# Patient Record
Sex: MALE | Race: WHITE | NOT HISPANIC OR LATINO | Employment: OTHER | RURAL
[De-identification: names, ages, dates, MRNs, and addresses within clinical notes are randomized per-mention and may not be internally consistent; named-entity substitution may affect disease eponyms.]

---

## 2022-08-03 ENCOUNTER — HOSPITAL ENCOUNTER (OUTPATIENT)
Dept: RADIOLOGY | Facility: HOSPITAL | Age: 77
Discharge: HOME | End: 2022-08-03
Attending: INTERNAL MEDICINE
Payer: OTHER GOVERNMENT

## 2022-08-03 DIAGNOSIS — I73.9 PERIPHERAL VASCULAR DISEASE, UNSPECIFIED: ICD-10-CM

## 2022-08-03 PROCEDURE — 93925 LOWER EXTREMITY STUDY: CPT | Mod: TC

## 2022-08-03 PROCEDURE — 93925 LOWER EXTREMITY STUDY: CPT | Mod: 26,,, | Performed by: RADIOLOGY

## 2022-08-03 PROCEDURE — 93925 US ARTERIAL LOWER EXTREMITY BILAT WITH ABI (XPD): ICD-10-PCS | Mod: 26,,, | Performed by: RADIOLOGY

## 2022-08-03 PROCEDURE — 93922 UPR/L XTREMITY ART 2 LEVELS: CPT | Mod: 26,,, | Performed by: RADIOLOGY

## 2022-08-03 PROCEDURE — 93922 US ARTERIAL LOWER EXTREMITY BILAT WITH ABI (XPD): ICD-10-PCS | Mod: 26,,, | Performed by: RADIOLOGY

## 2024-07-18 ENCOUNTER — INITIAL CONSULT (OUTPATIENT)
Dept: VASCULAR SURGERY | Facility: CLINIC | Age: 79
End: 2024-07-18
Payer: OTHER GOVERNMENT

## 2024-07-18 VITALS
HEART RATE: 79 BPM | TEMPERATURE: 98 F | HEIGHT: 71 IN | DIASTOLIC BLOOD PRESSURE: 57 MMHG | SYSTOLIC BLOOD PRESSURE: 110 MMHG | OXYGEN SATURATION: 97 % | BODY MASS INDEX: 30.38 KG/M2 | WEIGHT: 217 LBS

## 2024-07-18 DIAGNOSIS — I73.9 PVD (PERIPHERAL VASCULAR DISEASE): Primary | ICD-10-CM

## 2024-07-18 PROCEDURE — 99214 OFFICE O/P EST MOD 30 MIN: CPT | Mod: PBBFAC | Performed by: SURGERY

## 2024-07-18 PROCEDURE — 99202 OFFICE O/P NEW SF 15 MIN: CPT | Mod: S$PBB,,, | Performed by: SURGERY

## 2024-07-18 PROCEDURE — 99999 PR PBB SHADOW E&M-EST. PATIENT-LVL IV: CPT | Mod: PBBFAC,,, | Performed by: SURGERY

## 2024-07-18 RX ORDER — INSULIN GLARGINE 100 [IU]/ML
INJECTION, SOLUTION SUBCUTANEOUS DAILY
COMMUNITY
Start: 2022-06-22

## 2024-07-18 RX ORDER — ROSUVASTATIN CALCIUM 5 MG/1
5 TABLET, COATED ORAL DAILY
COMMUNITY
Start: 2024-06-25

## 2024-07-18 RX ORDER — GLIPIZIDE 10 MG/1
10 TABLET ORAL 2 TIMES DAILY
COMMUNITY

## 2024-07-18 RX ORDER — CETIRIZINE HYDROCHLORIDE 10 MG/1
10 TABLET ORAL DAILY
COMMUNITY

## 2024-07-18 RX ORDER — LEVETIRACETAM 500 MG/1
500 TABLET ORAL DAILY
COMMUNITY
Start: 2023-10-17

## 2024-07-18 RX ORDER — LISINOPRIL 10 MG/1
10 TABLET ORAL DAILY
COMMUNITY
Start: 2024-03-22

## 2024-07-18 RX ORDER — WARFARIN 2 MG/1
2 TABLET ORAL DAILY
COMMUNITY
Start: 2022-06-22

## 2024-07-18 RX ORDER — MAGNESIUM OXIDE 420 MG/1
840 TABLET ORAL
COMMUNITY
Start: 2024-04-17

## 2024-07-18 RX ORDER — METOPROLOL SUCCINATE 25 MG/1
25 TABLET, EXTENDED RELEASE ORAL DAILY
COMMUNITY
Start: 2022-06-22

## 2024-07-18 RX ORDER — CYANOCOBALAMIN 1000 UG/ML
1000 INJECTION, SOLUTION INTRAMUSCULAR; SUBCUTANEOUS ONCE
COMMUNITY
Start: 2022-06-22

## 2024-07-18 RX ORDER — AMMONIUM LACTATE 12 G/100G
CREAM TOPICAL
COMMUNITY

## 2024-07-18 RX ORDER — HYDROCHLOROTHIAZIDE 12.5 MG/1
12.5 TABLET ORAL DAILY
COMMUNITY
Start: 2024-04-17

## 2024-07-18 NOTE — PROGRESS NOTES
"Subjective:       Patient ID: Michi Paula is a 78 y.o. male.    Chief Complaint: Follow-up (No pain /Vascular disease )  New patient.  This have small wound in his right foot this week 2-0 his healing.  He has wound on the plantar aspect by the great toe of the left that is beginning to heal but has been there longer.  Said some noninvasive arterial studies show flow in all visualized vessels no focal doubling of velocities the pursued a CT angiogram that is suggests possibly some severe tibial disease he has no segmental pressures were ABIs to allow us to quantify the significance of his vascular disease we are going to obtain these  family history is not on file.  History reviewed. No pertinent past medical history.   No past surgical history on file.       HPI  Review of Systems      Objective:      BP (!) 110/57   Pulse 79   Temp 97.9 °F (36.6 °C)   Ht 5' 11" (1.803 m)   Wt 98.4 kg (217 lb)   SpO2 97%   BMI 30.27 kg/m²    Physical Exam  Constitutional:       Appearance: Normal appearance.   HENT:      Head: Normocephalic.   Cardiovascular:      Rate and Rhythm: Normal rate.   Musculoskeletal:         General: Normal range of motion.   Skin:     General: Skin is warm.      Capillary Refill: Capillary refill takes less than 2 seconds.      Comments: Small wound less than a cm scab dorsum base great toe right it was a linear 1/2 by proximally 3 or 4 mm clean based fissure appearing area of the plantar aspect of the great toe on left   Neurological:      General: No focal deficit present.      Mental Status: He is alert.   Psychiatric:         Mood and Affect: Mood normal.         Behavior: Behavior normal.         Thought Content: Thought content normal.         Judgment: Judgment normal.         Assessment:       1. PVD (peripheral vascular disease)        Plan:       Segmental limb pressures and ABIs follow-up after above      "

## 2024-08-15 ENCOUNTER — HOSPITAL ENCOUNTER (OUTPATIENT)
Dept: RADIOLOGY | Facility: HOSPITAL | Age: 79
Discharge: HOME | End: 2024-08-15
Attending: SURGERY
Payer: OTHER GOVERNMENT

## 2024-08-15 ENCOUNTER — OFFICE VISIT (OUTPATIENT)
Dept: VASCULAR SURGERY | Facility: CLINIC | Age: 79
End: 2024-08-15
Payer: OTHER GOVERNMENT

## 2024-08-15 VITALS — HEART RATE: 98 BPM | DIASTOLIC BLOOD PRESSURE: 70 MMHG | SYSTOLIC BLOOD PRESSURE: 115 MMHG | OXYGEN SATURATION: 99 %

## 2024-08-15 DIAGNOSIS — R23.4 WOUND ESCHAR OF FOOT: Primary | ICD-10-CM

## 2024-08-15 DIAGNOSIS — I73.9 PVD (PERIPHERAL VASCULAR DISEASE): ICD-10-CM

## 2024-08-15 PROCEDURE — 99999 PR PBB SHADOW E&M-EST. PATIENT-LVL III: CPT | Mod: PBBFAC,,, | Performed by: NURSE PRACTITIONER

## 2024-08-15 PROCEDURE — 99214 OFFICE O/P EST MOD 30 MIN: CPT | Mod: S$PBB,,, | Performed by: NURSE PRACTITIONER

## 2024-08-15 PROCEDURE — 99213 OFFICE O/P EST LOW 20 MIN: CPT | Mod: PBBFAC,25 | Performed by: NURSE PRACTITIONER

## 2024-08-15 PROCEDURE — 93923 UPR/LXTR ART STDY 3+ LVLS: CPT | Mod: TC

## 2024-08-15 NOTE — PROGRESS NOTES
Subjective:       Patient ID: Michi Paula is a 79 y.o. male.    Chief Complaint: Follow-up (Follow up after ultrasound )    family history is not on file.  History reviewed. No pertinent past medical history.   History reviewed. No pertinent surgical history.     08/15/2024  Previous evaluated by Dr. Erickson he is followed by [facility] for chronic left great toe wound patient is a diabetic nonsmoker arterial duplex with noncompressible right PT right DP 1.36  normal ABIs today right DON 1.36 left DON 1.22  decreased right TBI 0.56 suggest diabetic microvascular disease  Follow-up      Review of Systems      Objective:      /70   Pulse 98   SpO2 99%    Physical Exam  Vitals and nursing note reviewed.   Constitutional:       Appearance: Normal appearance.   HENT:      Head: Normocephalic.      Mouth/Throat:      Mouth: Mucous membranes are moist.   Eyes:      Conjunctiva/sclera: Conjunctivae normal.   Cardiovascular:      Rate and Rhythm: Normal rate and regular rhythm.   Pulmonary:      Effort: Pulmonary effort is normal.   Abdominal:      Palpations: Abdomen is soft.   Musculoskeletal:      Comments: Ambulates with walker   Skin:     General: Skin is warm and dry.      Comments: Posterior left great toe with callus and small approximately 1 cm ulcer does not appear to be infected at base of toe   Neurological:      Mental Status: He is alert and oriented to person, place, and time.   Psychiatric:         Mood and Affect: Mood normal.           Assessment:       1. Wound eschar of foot        Plan:     Educated on arterial duplex ABIs  Continue follow-up with wound care  No further vascular workup or intervention indicated  Recommend good diabetes control for wound healing  Call or return p.r.n.

## 2024-08-19 ENCOUNTER — OFFICE VISIT (OUTPATIENT)
Dept: FAMILY MEDICINE | Facility: CLINIC | Age: 79
End: 2024-08-19
Payer: OTHER GOVERNMENT

## 2024-08-19 ENCOUNTER — HOSPITAL ENCOUNTER (EMERGENCY)
Facility: HOSPITAL | Age: 79
Discharge: HOME | End: 2024-08-19
Payer: OTHER GOVERNMENT

## 2024-08-19 VITALS
BODY MASS INDEX: 28.98 KG/M2 | OXYGEN SATURATION: 97 % | HEIGHT: 71 IN | SYSTOLIC BLOOD PRESSURE: 130 MMHG | WEIGHT: 207 LBS | DIASTOLIC BLOOD PRESSURE: 68 MMHG | TEMPERATURE: 98 F | HEART RATE: 74 BPM | RESPIRATION RATE: 16 BRPM

## 2024-08-19 VITALS
RESPIRATION RATE: 20 BRPM | TEMPERATURE: 98 F | SYSTOLIC BLOOD PRESSURE: 126 MMHG | BODY MASS INDEX: 30.38 KG/M2 | OXYGEN SATURATION: 95 % | WEIGHT: 217 LBS | HEART RATE: 65 BPM | DIASTOLIC BLOOD PRESSURE: 67 MMHG | HEIGHT: 71 IN

## 2024-08-19 DIAGNOSIS — N50.89 SCROTAL EDEMA: ICD-10-CM

## 2024-08-19 DIAGNOSIS — N50.3 CYST OF EPIDIDYMIS DETERMINED BY ULTRASOUND: ICD-10-CM

## 2024-08-19 DIAGNOSIS — R33.9 ACUTE ON CHRONIC URINARY RETENTION: ICD-10-CM

## 2024-08-19 DIAGNOSIS — N50.89 SCROTAL SWELLING: Primary | ICD-10-CM

## 2024-08-19 DIAGNOSIS — N43.3 HYDROCELE, RIGHT: Primary | ICD-10-CM

## 2024-08-19 PROBLEM — I48.91 ATRIAL FIBRILLATION: Status: ACTIVE | Noted: 2024-08-19

## 2024-08-19 PROBLEM — G40.909 SEIZURE DISORDER: Status: ACTIVE | Noted: 2024-08-19

## 2024-08-19 PROBLEM — I10 BENIGN ESSENTIAL HYPERTENSION: Status: ACTIVE | Noted: 2024-08-19

## 2024-08-19 PROBLEM — E11.42 DIABETIC PERIPHERAL NEUROPATHY: Status: ACTIVE | Noted: 2024-08-19

## 2024-08-19 PROBLEM — I77.9 PERIPHERAL ARTERIAL OCCLUSIVE DISEASE: Status: ACTIVE | Noted: 2024-08-19

## 2024-08-19 PROBLEM — E78.5 HYPERLIPIDEMIA: Status: ACTIVE | Noted: 2024-08-19

## 2024-08-19 LAB
ALBUMIN SERPL BCP-MCNC: 3.1 G/DL (ref 3.5–5)
ALBUMIN/GLOB SERPL: 0.8 {RATIO}
ALP SERPL-CCNC: 58 U/L (ref 45–115)
ALT SERPL W P-5'-P-CCNC: 21 U/L (ref 16–61)
ANION GAP SERPL CALCULATED.3IONS-SCNC: 10 MMOL/L (ref 7–16)
AST SERPL W P-5'-P-CCNC: 20 U/L (ref 15–37)
BASOPHILS # BLD AUTO: 0.1 K/UL (ref 0–0.2)
BASOPHILS NFR BLD AUTO: 0.9 % (ref 0–1)
BILIRUB SERPL-MCNC: 0.8 MG/DL (ref ?–1.2)
BILIRUB UR QL STRIP: NEGATIVE
BUN SERPL-MCNC: 18 MG/DL (ref 7–18)
BUN/CREAT SERPL: 15 (ref 6–20)
CALCIUM SERPL-MCNC: 9.8 MG/DL (ref 8.5–10.1)
CHLORIDE SERPL-SCNC: 103 MMOL/L (ref 98–107)
CLARITY UR: ABNORMAL
CO2 SERPL-SCNC: 30 MMOL/L (ref 21–32)
COLOR UR: YELLOW
CREAT SERPL-MCNC: 1.19 MG/DL (ref 0.7–1.3)
DIFFERENTIAL METHOD BLD: ABNORMAL
EGFR (NO RACE VARIABLE) (RUSH/TITUS): 62 ML/MIN/1.73M2
EOSINOPHIL # BLD AUTO: 0.35 K/UL (ref 0–0.5)
EOSINOPHIL NFR BLD AUTO: 3.2 % (ref 1–4)
ERYTHROCYTE [DISTWIDTH] IN BLOOD BY AUTOMATED COUNT: 13.1 % (ref 11.5–14.5)
GLOBULIN SER-MCNC: 4 G/DL (ref 2–4)
GLUCOSE SERPL-MCNC: 106 MG/DL (ref 74–106)
GLUCOSE UR STRIP-MCNC: 500 MG/DL
HCT VFR BLD AUTO: 45.4 % (ref 40–54)
HGB BLD-MCNC: 14.8 G/DL (ref 13.5–18)
HYALINE CASTS #/AREA URNS LPF: ABNORMAL /LPF
IMM GRANULOCYTES # BLD AUTO: 0.05 K/UL (ref 0–0.04)
IMM GRANULOCYTES NFR BLD: 0.5 % (ref 0–0.4)
KETONES UR STRIP-SCNC: NEGATIVE MG/DL
LEUKOCYTE ESTERASE UR QL STRIP: NEGATIVE
LYMPHOCYTES # BLD AUTO: 3.1 K/UL (ref 1–4.8)
LYMPHOCYTES NFR BLD AUTO: 28.7 % (ref 27–41)
MCH RBC QN AUTO: 28.6 PG (ref 27–31)
MCHC RBC AUTO-ENTMCNC: 32.6 G/DL (ref 32–36)
MCV RBC AUTO: 87.8 FL (ref 80–96)
MONOCYTES # BLD AUTO: 1.08 K/UL (ref 0–0.8)
MONOCYTES NFR BLD AUTO: 10 % (ref 2–6)
MPC BLD CALC-MCNC: 10.6 FL (ref 9.4–12.4)
MUCOUS, UA: ABNORMAL /LPF
NEUTROPHILS # BLD AUTO: 6.13 K/UL (ref 1.8–7.7)
NEUTROPHILS NFR BLD AUTO: 56.7 % (ref 53–65)
NITRITE UR QL STRIP: NEGATIVE
NRBC # BLD AUTO: 0 X10E3/UL
NRBC, AUTO (.00): 0 %
PH UR STRIP: 5.5 PH UNITS
PLATELET # BLD AUTO: 289 K/UL (ref 150–400)
POTASSIUM SERPL-SCNC: 3.6 MMOL/L (ref 3.5–5.1)
PROT SERPL-MCNC: 7.1 G/DL (ref 6.4–8.2)
PROT UR QL STRIP: 20
RBC # BLD AUTO: 5.17 M/UL (ref 4.6–6.2)
RBC # UR STRIP: ABNORMAL /UL
RBC #/AREA URNS HPF: 3 /HPF
SODIUM SERPL-SCNC: 139 MMOL/L (ref 136–145)
SP GR UR STRIP: 1.02
SQUAMOUS #/AREA URNS LPF: ABNORMAL /HPF
UROBILINOGEN UR STRIP-ACNC: NORMAL MG/DL
WBC # BLD AUTO: 10.81 K/UL (ref 4.5–11)
WBC #/AREA URNS HPF: 6 /HPF

## 2024-08-19 PROCEDURE — 81001 URINALYSIS AUTO W/SCOPE: CPT | Performed by: NURSE PRACTITIONER

## 2024-08-19 PROCEDURE — 81003 URINALYSIS AUTO W/O SCOPE: CPT | Performed by: NURSE PRACTITIONER

## 2024-08-19 PROCEDURE — 51798 US URINE CAPACITY MEASURE: CPT

## 2024-08-19 PROCEDURE — 99284 EMERGENCY DEPT VISIT MOD MDM: CPT | Mod: 25

## 2024-08-19 PROCEDURE — 99202 OFFICE O/P NEW SF 15 MIN: CPT | Mod: ,,, | Performed by: NURSE PRACTITIONER

## 2024-08-19 PROCEDURE — 85025 COMPLETE CBC W/AUTO DIFF WBC: CPT | Performed by: NURSE PRACTITIONER

## 2024-08-19 PROCEDURE — 25000003 PHARM REV CODE 250: Performed by: NURSE PRACTITIONER

## 2024-08-19 PROCEDURE — 80053 COMPREHEN METABOLIC PANEL: CPT | Performed by: NURSE PRACTITIONER

## 2024-08-19 RX ORDER — TAMSULOSIN HYDROCHLORIDE 0.4 MG/1
0.4 CAPSULE ORAL
Status: COMPLETED | OUTPATIENT
Start: 2024-08-19 | End: 2024-08-19

## 2024-08-19 RX ORDER — TAMSULOSIN HYDROCHLORIDE 0.4 MG/1
0.4 CAPSULE ORAL DAILY
Qty: 10 CAPSULE | Refills: 0 | Status: SHIPPED | OUTPATIENT
Start: 2024-08-19 | End: 2024-08-29

## 2024-08-19 RX ADMIN — TAMSULOSIN HYDROCHLORIDE 0.4 MG: 0.4 CAPSULE ORAL at 06:08

## 2024-08-19 NOTE — PROGRESS NOTES
Subjective:       Patient ID: Michi Paula is a 79 y.o. male.    Chief Complaint: Scrotal swelling    Scrotal swelling x 1 month- no pain, discharge or dysuria- no known injury  Pt sent to [facility] for further evaluation and treatment- accompanied by his wife- report called to [facility]  Called and spoke with urology and they recommended sending pt to ER since we are unable to get a Testicular US approved today d/t his insurance    Review of Systems   Constitutional:  Negative for appetite change, chills, fatigue and fever.   HENT:  Negative for nasal congestion, ear pain and sore throat.    Eyes:  Negative for pain, discharge and itching.   Respiratory:  Negative for cough and shortness of breath.    Cardiovascular:  Negative for chest pain and leg swelling.   Gastrointestinal:  Negative for abdominal pain, change in bowel habit, nausea and vomiting.   Genitourinary:  Positive for scrotal swelling. Negative for dysuria, flank pain, frequency, genital sores and urgency.   Musculoskeletal:  Negative for back pain, gait problem and neck pain.   Integumentary:  Negative for rash and wound.   Neurological:  Negative for dizziness, weakness and headaches.   All other systems reviewed and are negative.        Objective:      Physical Exam  Vitals and nursing note reviewed.   Constitutional:       General: He is not in acute distress.     Appearance: Normal appearance. He is not ill-appearing, toxic-appearing or diaphoretic.   HENT:      Head: Normocephalic.   Eyes:      General: No scleral icterus.     Extraocular Movements: Extraocular movements intact.      Pupils: Pupils are equal, round, and reactive to light.   Cardiovascular:      Rate and Rhythm: Normal rate and regular rhythm.      Pulses: Normal pulses.      Heart sounds: Normal heart sounds. No murmur heard.  Pulmonary:      Effort: Pulmonary effort is normal. No respiratory distress.      Breath sounds: Normal breath sounds. No wheezing, rhonchi or  rales.   Genitourinary:     Pubic Area: No rash.       Penis: No discharge.       Comments: Scrotum is firm, swollen, and erythematous- no lesions, no tenderness, drainage or rash noted  Musculoskeletal:         General: Normal range of motion.      Cervical back: Neck supple. No tenderness.   Lymphadenopathy:      Cervical: No cervical adenopathy.   Skin:     General: Skin is warm and dry.      Capillary Refill: Capillary refill takes less than 2 seconds.      Findings: No lesion or rash.   Neurological:      Mental Status: He is alert and oriented to person, place, and time.   Psychiatric:         Mood and Affect: Mood normal.         Behavior: Behavior normal.         Thought Content: Thought content normal.         Judgment: Judgment normal.       No visits with results within 6 Month(s) from this visit.   Latest known visit with results is:   No results found for any previous visit.      Assessment:       1. Scrotal swelling        Plan:   Scrotal swelling           Risks, benefits, and side effects were discussed with the patient. All questions were answered to the fullest satisfaction of the patient, and pt verbalized understanding and agreement to treatment plan. Pt was to call with any new or worsening symptoms, or present to the ER

## 2024-08-19 NOTE — DISCHARGE INSTRUCTIONS
[facility]  Take medications as prescribed.  Return to the ER with new or worsening symptoms.

## 2024-08-19 NOTE — ED PROVIDER NOTES
Encounter Date: 8/19/2024       History     Chief Complaint   Patient presents with    Groin Swelling     Pt presents to ED via POV with c/o swollen right testicle since 2 months and has no pain.      Patient presents to the ER with complaint of scrotal edema.  Patient reports symptoms started 2 months ago.  He states the symptoms have been worse over the last month.  He reports he only urinates once or twice daily.  He states that has been ongoing for longer than 2 months.  [facility]  He denies fever.  He denies pain with urination.  He does report a fall 2 months ago that injured his left hip and groin.  He states he was slipped while getting in the bathtub.  This happened just prior to symptoms starting.  Patient was had x-rays done by primary care provider in symptoms from the fall have otherwise improved.    The history is provided by the patient and the spouse. No  was used.     Review of patient's allergies indicates:   Allergen Reactions    Penicillin Anaphylaxis    Venom-honey bee      History reviewed. No pertinent past medical history.  History reviewed. No pertinent surgical history.  No family history on file.  Social History     Tobacco Use    Smoking status: Never    Smokeless tobacco: Never   Substance Use Topics    Alcohol use: Never    Drug use: Never     Review of Systems   Constitutional:  Positive for activity change and fatigue.   Genitourinary:  Positive for decreased urine volume, difficulty urinating and scrotal swelling. Negative for testicular pain and urgency.   All other systems reviewed and are negative.      Physical Exam     Initial Vitals [08/19/24 1321]   BP Pulse Resp Temp SpO2   114/62 68 18 97.6 °F (36.4 °C) 96 %      MAP       --         Physical Exam    Nursing note and vitals reviewed.  Constitutional: He appears well-developed and well-nourished.   HENT:   Head: Normocephalic.   Nose: Nose normal.   Mouth/Throat: Oropharynx is clear  and moist.   Eyes: Conjunctivae and EOM are normal.   Neck: Neck supple.   Normal range of motion.  Cardiovascular:  Normal rate, normal heart sounds and intact distal pulses.           Pulmonary/Chest: Breath sounds normal.   Abdominal: Abdomen is soft. Bowel sounds are normal.   Genitourinary: Right testis shows swelling. Left testis shows swelling.   Musculoskeletal:         General: Normal range of motion.      Cervical back: Normal range of motion and neck supple.     Neurological: He is alert and oriented to person, place, and time. He has normal strength. GCS score is 15. GCS eye subscore is 4. GCS verbal subscore is 5. GCS motor subscore is 6.   Skin: Skin is warm and dry. Capillary refill takes less than 2 seconds.   Psychiatric: He has a normal mood and affect.         Medical Screening Exam   See Full Note    ED Course   Procedures  Labs Reviewed   COMPREHENSIVE METABOLIC PANEL - Abnormal       Result Value    Sodium 139      Potassium 3.6      Chloride 103      CO2 30      Anion Gap 10      Glucose 106      BUN 18      Creatinine 1.19      BUN/Creatinine Ratio 15      Calcium 9.8      Total Protein 7.1      Albumin 3.1 (*)     Globulin 4.0      A/G Ratio 0.8      Bilirubin, Total 0.8      Alk Phos 58      ALT 21      AST 20      eGFR 62     URINALYSIS, REFLEX TO URINE CULTURE - Abnormal    Color, UA Yellow      Clarity, UA Turbid      pH, UA 5.5      Leukocytes, UA Negative      Nitrites, UA Negative      Protein, UA 20 (*)     Glucose,  (*)     Ketones, UA Negative      Urobilinogen, UA Normal      Bilirubin, UA Negative      Blood, UA Trace (*)     Specific Gravity, UA 1.024     CBC WITH DIFFERENTIAL - Abnormal    WBC 10.81      RBC 5.17      Hemoglobin 14.8      Hematocrit 45.4      MCV 87.8      MCH 28.6      MCHC 32.6      RDW 13.1      Platelet Count 289      MPV 10.6      Neutrophils % 56.7      Lymphocytes % 28.7      Monocytes % 10.0 (*)     Eosinophils % 3.2      Basophils % 0.9       Immature Granulocytes % 0.5 (*)     nRBC, Auto 0.0      Neutrophils, Abs 6.13      Lymphocytes, Absolute 3.10      Monocytes, Absolute 1.08 (*)     Eosinophils, Absolute 0.35      Basophils, Absolute 0.10      Immature Granulocytes, Absolute 0.05 (*)     nRBC, Absolute 0.00      Diff Type Auto     URINALYSIS, MICROSCOPIC - Abnormal    WBC, UA 6 (*)     RBC, UA 3      Squamous Epithelial Cells, UA Occasional (*)     Hyaline Casts, UA 2-5 (*)     Mucous Occasional (*)    CBC W/ AUTO DIFFERENTIAL    Narrative:     The following orders were created for panel order CBC auto differential.  Procedure                               Abnormality         Status                     ---------                               -----------         ------                     CBC with Differential[[id number]]       Abnormal            Final result                 Please view results for these tests on the individual orders.          Imaging Results              US Scrotum And Testicles (Final result)  Result time 08/19/24 15:25:34      Final result by Ricardo Venegas MD (08/19/24 15:25:34)                   Impression:      Prominent right-sided hydrocele    Left epididymal cyst    No intrinsic testicular mass      Electronically signed by: Ricardo Venegas  Date:    08/19/2024  Time:    15:25               Narrative:    EXAMINATION:  US SCROTUM AND TESTICLES    CLINICAL HISTORY:  .  Other specified disorders of the male genital organs    COMPARISON:  No previous similar    TECHNIQUE:  Real-time ultrasound images are captured and archived.  Grayscale and color Doppler images of the testicles are submitted.    FINDINGS:  Right testicle measures 37 x 27 x 15 mm; left measures 43 x 28 x 21 mm.  There is no intrinsic testicular mass.  There is color Doppler flow to either testicle.    There is prominent right-sided hydrocele measuring up to 9.5 cm maximum diameter.    Head of the right epididymis measures 11 x 9 x 8 mm; left measures 18  x 14 x 13 mm.  There is a 12 x 10 x 12 mm partially septated cyst in the head of the left epididymis                                       Medications   tamsulosin 24 hr capsule 0.4 mg (has no administration in time range)     Medical Decision Making  Patient presents to the ER with complaint of scrotal edema.  Patient reports symptoms started 2 months ago.  He states the symptoms have been worse over the last month.  He reports he only urinates once or twice daily.  He states that has been ongoing for longer than 2 months.  [facility]  He denies fever.  He denies pain with urination.  He does report a fall 2 months ago that injured his left hip and groin.  He states he was slipped while getting in the bathtub.  This happened just prior to symptoms starting.  Patient was had x-rays done by primary care provider in symptoms from the fall have otherwise improved.      Amount and/or Complexity of Data Reviewed  Independent Historian: patricia  External Data Reviewed: labs, radiology and notes.  Labs: ordered. Decision-making details documented in ED Course.  Radiology: ordered. Decision-making details documented in ED Course.  Discussion of management or test interpretation with external provider(s): Flomax 0.4 mg p.o. to treat urinary retention  Bladder scan revealed 400 mL of urine in bladder-  patient was able to urinate just after bladder scan.  Patient was discharged home with diagnosis of right hydrocele, cyst of epididymis determined by ultrasound, scrotal edema and acute urinary retention.  [facility] was [facility] for recheck and referral to Urology.  He was given prescription for Flomax to take as prescribed.  He was given precautions and instructions and went in why to return to the ER.  Patient was wife both verbalized understanding agree with plan of care.    Risk  Prescription drug management.                                      Clinical  Impression:   Final diagnoses:  [N50.89] Scrotal edema  [R33.9] Acute on chronic urinary retention  [N43.3] Hydrocele, right (Primary)  [N50.3] Cyst of epididymis determined by ultrasound        ED Disposition Condition    Discharge Stable          ED Prescriptions       Medication Sig Dispense Start Date End Date Auth. Provider    tamsulosin (FLOMAX) 0.4 mg Cap Take 1 capsule (0.4 mg total) by mouth once daily. for 10 days 10 capsule 8/19/2024 8/29/2024 Susy Power FNP          Follow-up Information       Follow up With Specialties Details Why Contact Info    Tracey Bell MD Internal Medicine Schedule an appointment as soon as possible for a visit in 2 days  [address] [facility] [address]  [phone]               Susy Power FNP  08/19/24 0844